# Patient Record
Sex: FEMALE | Race: WHITE | NOT HISPANIC OR LATINO | Employment: FULL TIME | URBAN - NONMETROPOLITAN AREA
[De-identification: names, ages, dates, MRNs, and addresses within clinical notes are randomized per-mention and may not be internally consistent; named-entity substitution may affect disease eponyms.]

---

## 2020-08-10 ENCOUNTER — OFFICE VISIT CONVERTED (OUTPATIENT)
Dept: FAMILY MEDICINE CLINIC | Facility: CLINIC | Age: 25
End: 2020-08-10
Attending: NURSE PRACTITIONER

## 2020-12-01 ENCOUNTER — TELEPHONE CONVERTED (OUTPATIENT)
Dept: FAMILY MEDICINE CLINIC | Facility: CLINIC | Age: 25
End: 2020-12-01
Attending: NURSE PRACTITIONER

## 2021-02-05 ENCOUNTER — OFFICE VISIT CONVERTED (OUTPATIENT)
Dept: FAMILY MEDICINE CLINIC | Facility: CLINIC | Age: 26
End: 2021-02-05
Attending: NURSE PRACTITIONER

## 2021-02-05 ENCOUNTER — CONVERSION ENCOUNTER (OUTPATIENT)
Dept: FAMILY MEDICINE CLINIC | Facility: CLINIC | Age: 26
End: 2021-02-05

## 2021-02-09 ENCOUNTER — HOSPITAL ENCOUNTER (OUTPATIENT)
Dept: GENERAL RADIOLOGY | Facility: HOSPITAL | Age: 26
Discharge: HOME | End: 2021-02-09
Attending: NURSE PRACTITIONER

## 2021-02-09 LAB
ALBUMIN SERPL-MCNC: 4.2 G/DL (ref 3.5–5)
ALBUMIN/GLOB SERPL: 1.4 {RATIO} (ref 1.4–2.6)
ALP SERPL-CCNC: 129 U/L (ref 42–98)
ALT SERPL-CCNC: 9 U/L (ref 10–40)
ANION GAP SERPL CALC-SCNC: 15 MMOL/L (ref 8–19)
AST SERPL-CCNC: 14 U/L (ref 15–50)
BASOPHILS # BLD AUTO: 0.04 10*3/UL (ref 0–0.2)
BASOPHILS NFR BLD AUTO: 0.4 % (ref 0–3)
BILIRUB SERPL-MCNC: 0.37 MG/DL (ref 0.2–1.3)
BUN SERPL-MCNC: 9 MG/DL (ref 5–25)
BUN/CREAT SERPL: 13 {RATIO} (ref 6–20)
CALCIUM SERPL-MCNC: 9.1 MG/DL (ref 8.7–10.4)
CHLORIDE SERPL-SCNC: 101 MMOL/L (ref 99–111)
CHOLEST SERPL-MCNC: 123 MG/DL (ref 107–200)
CHOLEST/HDLC SERPL: 2.9 {RATIO} (ref 3–6)
CONV ABS IMM GRAN: 0.01 10*3/UL (ref 0–0.2)
CONV CO2: 25 MMOL/L (ref 22–32)
CONV IMMATURE GRAN: 0.1 % (ref 0–1.8)
CONV TOTAL PROTEIN: 7.2 G/DL (ref 6.3–8.2)
CREAT UR-MCNC: 0.7 MG/DL (ref 0.5–0.9)
DEPRECATED RDW RBC AUTO: 38.7 FL (ref 36.4–46.3)
EOSINOPHIL # BLD AUTO: 0.35 10*3/UL (ref 0–0.7)
EOSINOPHIL # BLD AUTO: 3.8 % (ref 0–7)
ERYTHROCYTE [DISTWIDTH] IN BLOOD BY AUTOMATED COUNT: 13 % (ref 11.7–14.4)
GFR SERPLBLD BASED ON 1.73 SQ M-ARVRAT: >60 ML/MIN/{1.73_M2}
GLOBULIN UR ELPH-MCNC: 3 G/DL (ref 2–3.5)
GLUCOSE SERPL-MCNC: 95 MG/DL (ref 65–99)
HCT VFR BLD AUTO: 39.2 % (ref 37–47)
HDLC SERPL-MCNC: 42 MG/DL (ref 40–60)
HGB BLD-MCNC: 13 G/DL (ref 12–16)
LDLC SERPL CALC-MCNC: 62 MG/DL (ref 70–100)
LYMPHOCYTES # BLD AUTO: 3.15 10*3/UL (ref 1–5)
LYMPHOCYTES NFR BLD AUTO: 33.9 % (ref 20–45)
MCH RBC QN AUTO: 27.1 PG (ref 27–31)
MCHC RBC AUTO-ENTMCNC: 33.2 G/DL (ref 33–37)
MCV RBC AUTO: 81.7 FL (ref 81–99)
MONOCYTES # BLD AUTO: 0.54 10*3/UL (ref 0.2–1.2)
MONOCYTES NFR BLD AUTO: 5.8 % (ref 3–10)
NEUTROPHILS # BLD AUTO: 5.21 10*3/UL (ref 2–8)
NEUTROPHILS NFR BLD AUTO: 56 % (ref 30–85)
NRBC CBCN: 0 % (ref 0–0.7)
OSMOLALITY SERPL CALC.SUM OF ELEC: 282 MOSM/KG (ref 273–304)
PLATELET # BLD AUTO: 278 10*3/UL (ref 130–400)
PMV BLD AUTO: 10.4 FL (ref 9.4–12.3)
POTASSIUM SERPL-SCNC: 3.7 MMOL/L (ref 3.5–5.3)
RBC # BLD AUTO: 4.8 10*6/UL (ref 4.2–5.4)
SODIUM SERPL-SCNC: 137 MMOL/L (ref 135–147)
TRIGL SERPL-MCNC: 95 MG/DL (ref 40–150)
TSH SERPL-ACNC: 1.51 M[IU]/L (ref 0.27–4.2)
VLDLC SERPL-MCNC: 19 MG/DL (ref 5–37)
WBC # BLD AUTO: 9.3 10*3/UL (ref 4.8–10.8)

## 2021-05-10 NOTE — H&P
History and Physical      Patient Name: Sade Kilgore   Patient ID: [id number]   Sex: Female   YOB: 1995    Primary Care Provider: Yazmin POP   Referring Provider: Yazmin POP    Visit Date: August 10, 2020    Provider: DONN Finney   Location: [facility]   Location Address: [address] [place] [address] [address]  [employer], [place]  [id number]   Location Phone: [phone]          Chief Complaint  · Establish care.      History Of Present Illness  Sade Kilgore is a 24 year old /White female who presents for evaluation and treatment of:      New patient/establish care:    Previous patient: Dr. Morris    Lives in [place], works at [employer], goes to [employer], lives with mom.    No current daily medications. Has Ventolin inhaler uses PRN.     Acute complaint of nasal congestion and cough x several months. States she takes occasional Claritin and Robitussin to treat. Cough is worse at night. Denies fever, chills, SOB, HA, body aches, sore throat, loss of taste or smell.       Allergy List  Allergen Name Date Reaction Notes   NO KNOWN DRUG ALLERGIES --  --  --        Allergies Reconciled  Social History  Finding Status Start/Stop Quantity Notes   Tobacco Never --/-- --  Never         Review of Systems  · Constitutional  o Denies  o : fever, fatigue, weight loss, weight gain  · HENT  o Admits  o : nasal congestion  o Denies  o : headaches, sore throat  · Cardiovascular  o Denies  o : lower extremity edema, claudication, chest pressure, palpitations  · Respiratory  o Admits  o : cough  o Denies  o : shortness of breath, wheezing, hemoptysis, dyspnea on exertion  · Gastrointestinal  o Denies  o : nausea, vomiting, diarrhea, constipation, abdominal pain  · Musculoskeletal  o Denies  o : muscle pain  · Psychiatric  o Denies  o : anxiety, depression, suicidal ideation, homicidal ideation  · Allergic-Immunologic  o Admits  o :  "sinus allergy symptoms  o Denies  o : frequent illnesses      Vitals  Date Time BP Position Site L\R Cuff Size HR RR TEMP (F) WT  HT  BMI kg/m2 BSA m2 O2 Sat HC       08/10/2020 10:31 /66 Sitting    70 - R 20 98.8 191lbs 4oz 5'  4\" 32.83 1.98 98 %          Physical Examination  · Constitutional  o Appearance  o : no acute distress, well-nourished  · Head and Face  o Head  o :   § Inspection  § : atraumatic, normocephalic  o Face  o :   § Inspection  § : no facial lesions  · Eyes  o Eyes  o : extraocular movements intact, no scleral icterus, no conjunctival injection  · Ears, Nose, Mouth and Throat  o Ears  o :   § External Ears  § : normal  § Otoscopic Examination  § : tympanic membrane appearance within normal limits bilaterally  o Nose  o :   § Intranasal Exam  § : nares patent  o Oral Cavity  o :   § Oral Mucosa  § : moist mucous membranes  o Throat  o :   § Oropharynx  § : discharge present, tonsils within normal limits  · Respiratory  o Respiratory Effort  o : breathing comfortably, symmetric chest rise  o Auscultation of Lungs  o : clear to asculatation bilaterally, no wheezes, rales, or rhonchii  · Cardiovascular  o Heart  o :   § Auscultation of Heart  § : regular rate and rhythm, no murmurs, rubs, or gallops  o Peripheral Vascular System  o :   § Extremities  § : no edema  · Lymphatic  o Neck  o : no lymphadenopathy present  o Supraclavicular Nodes  o : no supraclavicular nodes  · Skin and Subcutaneous Tissue  o General Inspection  o : no lesions present, no areas of discoloration, skin turgor normal  · Neurologic  o Mental Status Examination  o :   § Orientation  § : grossly oriented to person, place and time  o Gait and Station  o :   § Gait Screening  § : normal gait  · Psychiatric  o General  o : normal mood and affect              Assessment  · Allergic rhinitis due to allergen     477.9/J30.9  · Asthma     493.90/J45.909  · Screening for depression     V79.0/Z13.89  · Establishing care with new " doctor, encounter for     V65.8/Z76.89       New patient/establish care:    Allergic rhinitis/asthma:  DC claritin  Start zyrtec 10mg PO qd  Start flutciasone UAD qd  Start montelukast 10mg PO qhs  Discussed all med SE/AEs including all BB warnings, pt will DC immed and notify office if thse occur  Refilled Ventolin inhaler UAD prn  Avoid allergy/sinus triggers  Consider allergist referral if necessary     Problems Reconciled  Plan  · Orders  o ACO-18: Negative screen for clinical depression using a standardized tool () - V79.0/Z13.89 - 08/10/2020  o ACO-39: Current medications updated and reviewed () - - 08/10/2020  o ACO-18: Negative screen for clinical depression using a standardized tool () - V79.0/Z13.89 - 08/10/2020  o ACO-14: Influenza immunization was not administered for reasons documented () - - 08/10/2020   declined  · Medications  o Singulair 10 mg oral tablet   SIG: take 1 tablet (10 mg) by oral route once daily in the evening for 30 days   DISP: (30) tablets with 5 refills  Prescribed on 08/10/2020     o Ventolin HFA 90 mcg/actuation inhalation HFA aerosol inhaler   SIG: inhale 1 - 2 puffs (90 - 180 mcg) by inhalation route every 4-6 hours as needed   DISP: (1) 8 gm canister with 5 refills  Prescribed on 08/10/2020     o fluticasone propionate 50 mcg/actuation nasal spray,suspension   SIG: spray 1 - 2 sprays in each nostril by intranasal route once daily   DISP: (1) 9.9 ml aer w/adap with 5 refills  Prescribed on 08/10/2020     o cetirizine 10 mg oral tablet   SIG: take 1 tablet (10 mg) by oral route once daily   DISP: (30) tablets with 5 refills  Prescribed on 08/10/2020     o Medications have been Reconciled  o Transition of Care or Provider Policy  · Instructions  o Depression Screen completed and scanned into the EMR under the designated folder within the patient's documents.  o Today's PHQ-9 result is __0_  o The provider screening met the required time of 15  minutes.  o Patient is taking medications as prescribed and doing well.   o Take all medications as prescribed/directed.  o Rest. Increase Fluids.  o Patient was educated/instructed on their diagnosis, treatment and medications prior to discharge from the clinic today.  o Patient instructed to seek medical attention urgently for new or worsening symptoms.  o Trusted Web sites were provided.  o Call the office with any concerns or questions.  o Bring all medicines with their bottles to each office visit.  o Chronic conditions reviewed and taken into consideration for today's treatment plan.  o Flu vaccine declined.  o Discussed Covid-19 precautions including, but not limited to, social distancing, avoid touching your face, and hand washing.   · Disposition  o Call or Return if symptoms worsen or persist.  o Follow Up PRN.  o Follow Up in 1 year.  o Proceed to ED for all medical emergencies.            Electronically Signed by: DONN Finney -Author on August 10, 2020 10:51:26 AM

## 2021-05-13 NOTE — PROGRESS NOTES
Progress Note      Patient Name: Sade Kilgore   Patient ID: [id number]   Sex: Female   YOB: 1995    Primary Care Provider: Yazmin POP   Referring Provider: Yazmin POP    Visit Date: December 1, 2020    Provider: DONN Finney   Location: [facility]   Location Address: [address] [place] [address]  [id number]   Location Phone: [phone]          Chief Complaint  · Cough, drainage, no fever, x1 month.      History Of Present Illness  TELEHEALTH TELEPHONE VISIT  Sade Kilgore is a 25 year old /White female who is presenting for evaluation via telehealth telephone visit. Verbal consent obtained before beginning visit.   Provider spent 13 minutes with patient during telehealth visit.   The following staff were present during this visit: DONN Finney   Past Medical History/Overview of Patient Symptoms  Sade Kilgore is a 25 year old /White female who presents for evaluation and treatment of:      Pt c/o cough, nasal congestion, L ear pain x 1 month worsening over the past week. Taking Zyrtec, Flonase, and singulair to treat. Denies fever, chills, SOB, HA, sudden loss of taste or smell, myalgia. Denies any recent travel or sick contacts.       Medication List  Name Date Started Instructions   cetirizine 10 mg oral tablet 08/10/2020 take 1 tablet (10 mg) by oral route once daily   fluticasone propionate 50 mcg/actuation nasal spray,suspension 08/10/2020 spray 1 - 2 sprays in each nostril by intranasal route once daily   Singulair 10 mg oral tablet 08/10/2020 take 1 tablet (10 mg) by oral route once daily in the evening for 30 days   Ventolin HFA 90 mcg/actuation inhalation HFA aerosol inhaler 08/10/2020 inhale 1 - 2 puffs (90 - 180 mcg) by inhalation route every 4-6 hours as needed         Allergy List  Allergen Name Date Reaction Notes   NO KNOWN DRUG ALLERGIES --  --  --        Allergies  Reconciled  Social History  Finding Status Start/Stop Quantity Notes   Tobacco Never --/-- --  Never         Review of Systems  · Constitutional  o Denies  o : fever, fatigue, weight loss, weight gain  · HENT  o Admits  o : nasal congestion, ear pain  o Denies  o : headaches  · Cardiovascular  o Denies  o : lower extremity edema, claudication, chest pressure, palpitations  · Respiratory  o Admits  o : cough  o Denies  o : shortness of breath, wheezing, hemoptysis, dyspnea on exertion  · Gastrointestinal  o Denies  o : nausea, vomiting, diarrhea, constipation, abdominal pain  · Musculoskeletal  o Denies  o : back pain  · Psychiatric  o Denies  o : anxiety, depression, suicidal ideation, homicidal ideation              Assessment  · Allergic rhinitis due to allergen     477.9/J30.9  · Bronchitis, acute     466.0/J20.9  · Cough     786.2/R05  · Ear pain, left     388.70/H92.02       Bronchitis, acute:  Zpack UAD   Medrol Dose Srikanth UAD  Bromfed 10ml q4-6hrs PRN cough  increase rest  Increase clear fluids  Avoid allergy triggers  Continue allergy medications as directed       Plan  · Orders  o ACO-39: Current medications updated and reviewed (1159F, ) - - 12/01/2020  o ACO-14: Influenza immunization was not administered for reasons documented [facility] ([id number]) - - 12/01/2020   Declined  o Physician Telephone Evaluation, 11-20 minutes ([id number]) - - 12/01/2020  · Medications  o azithromycin 250 mg oral tablet   SIG: take 2 tablets (500 mg) by oral route once daily for 1 day then 1 tablet (250 mg) by oral route once daily for 4 days   DISP: (6) Tablet with 0 refills  Prescribed on 12/01/2020     o Medrol (Srikanth) 4 mg oral tablets,dose pack   SIG: take as directed   DISP: (1) Packet with 0 refills  Prescribed on 12/01/2020     o Bromfed DM 2-30-10 mg/5 mL oral syrup   SIG: take 10 milliliters by oral route every 4 hours   DISP: (118) Milliliter with 0 refills  Prescribed on 12/01/2020     o Medications have been  Reconciled  o Transition of Care or Provider Policy  · Instructions  o Plan Of Care: Bronchitis, acute  o Chronic conditions reviewed and taken into consideration for today's treatment plan.  o Patient instructed to seek medical attention urgently for new or worsening symptoms.  o Patient was educated/instructed on their diagnosis, treatment and medications prior to discharge from the clinic today.  o Take all medications as prescribed/directed.  o Call the office with any concerns or questions.  o Risks, benefits, and alternatives were discussed with the patient. The patient is aware of risks associated with: all meds and conditions.   o Discussed Covid-19 precautions including, but not limited to, social distancing, avoid touching your face, and hand washing.   o Flu vaccine declined.  · Disposition  o Call or Return if symptoms worsen or persist.  o Follow Up PRN.  o Proceed to ED for all medical emergencies.            Electronically Signed by: DONN Finney -Author on December 1, 2020 10:47:22 AM

## 2021-05-14 VITALS
OXYGEN SATURATION: 100 % | HEART RATE: 75 BPM | SYSTOLIC BLOOD PRESSURE: 124 MMHG | RESPIRATION RATE: 20 BRPM | DIASTOLIC BLOOD PRESSURE: 70 MMHG | BODY MASS INDEX: 32.97 KG/M2 | WEIGHT: 193.12 LBS | TEMPERATURE: 97.8 F | HEIGHT: 64 IN

## 2021-05-14 NOTE — PROGRESS NOTES
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 Acute c/o facial acne. Believes it to be d/t mask. Has tried several OTC medications without success.       Medication List  Name Date Started Instructions   cetirizine 10 mg oral tablet 08/10/2020 take 1 tablet (10 mg) by oral route once daily   fluticasone propionate 50 mcg/actuation nasal spray,suspension 08/10/2020 spray 1 - 2 sprays in each nostril by intranasal route once daily   Singulair 10 mg oral tablet 08/10/2020 take 1 tablet (10 mg) by oral route once daily in the evening for 30 days   Ventolin HFA 90 mcg/actuation inhalation HFA aerosol inhaler 08/10/2020 inhale 1 - 2 puffs (90 - 180 mcg) by inhalation route every 4-6 hours as needed         Allergy List  Allergen Name Date Reaction Notes   NO KNOWN DRUG ALLERGIES --  --  --        Allergies Reconciled  Social History  Finding Status Start/Stop Quantity Notes   Tobacco Never --/-- --  Never         Review of Systems  · Constitutional  o Denies  o : fever, fatigue, weight loss, weight gain  · HENT  o Denies  o : headaches, nasal congestion, sore throat  · Cardiovascular  o Denies  o : lower extremity edema, claudication, chest pressure, palpitations  · Respiratory  o Denies  o : shortness of breath, wheezing, cough, hemoptysis, dyspnea on  "exertion  · Gastrointestinal  o Denies  o : nausea, vomiting, diarrhea, constipation, abdominal pain  · Integument  o Admits  o : acne  · Musculoskeletal  o Denies  o : muscle pain  · Psychiatric  o Denies  o : anxiety, depression, suicidal ideation, homicidal ideation      Vitals  Date Time BP Position Site L\R Cuff Size HR RR TEMP (F) WT  HT  BMI kg/m2 BSA m2 O2 Sat FR L/min FiO2 HC       02/05/2021 04:00 /70 Sitting    75 - R 20 97.8 193lbs 2oz 5'  4\" 33.15 1.99 100 %  21%          Physical Examination  · Constitutional  o Appearance  o : no acute distress, well-nourished  · Head and Face  o Head  o :   § Inspection  § : atraumatic, normocephalic  o Face  o :   § Inspection  § : no facial lesions  · Eyes  o Eyes  o : extraocular movements intact, no scleral icterus, no conjunctival injection  · Ears, Nose, Mouth and Throat  o Ears  o :   § External Ears  § : normal  o Nose  o :   § Intranasal Exam  § : nares patent  o Oral Cavity  o :   § Oral Mucosa  § : moist mucous membranes  · Neck  o Thyroid  o : gland size normal, nontender, no nodules or masses present on palpation, symmetric  · Respiratory  o Respiratory Effort  o : breathing comfortably, symmetric chest rise  o Auscultation of Lungs  o : clear to asculatation bilaterally, no wheezes, rales, or rhonchii  · Cardiovascular  o Heart  o :   § Auscultation of Heart  § : regular rate and rhythm, no murmurs, rubs, or gallops  o Peripheral Vascular System  o :   § Extremities  § : no edema  · Lymphatic  o Neck  o : no lymphadenopathy present  o Supraclavicular Nodes  o : no supraclavicular nodes  · Skin and Subcutaneous Tissue  o General Inspection  o : no lesions present, no areas of discoloration, skin turgor normal  · Neurologic  o Mental Status Examination  o :   § Orientation  § : grossly oriented to person, place and time  o Gait and Station  o :   § Gait Screening  § : normal gait  · Psychiatric  o General  o : normal mood and " affect              Assessment  · Allergic rhinitis due to allergen     477.9/J30.9  · Cough     786.2/R05  · Obesity     278.00/E66.9  · Screening for lipid disorders     V77.91/Z13.220  · Screening for thyroid disorder     V77.0/Z13.29  · Acne     706.1/L70.9       Acne:  Clinda 1% foam apply twice daily  tretinoin topical 0.1% once daily before bedtime  Keep clean and dry  Consider derm referral if needed    Obesity:  Encouraged weight loss  regular activity/exercise 30 min at least 4-5 days per week     Order for labs   Filled out, signed, and scanned biometric screening pwk into chart  Pt would like us to fax to number on form when completed     Problems Reconciled  Plan  · Orders  o Physical, Primary Care Panel (CBC, CMP, Lipid, TSH) [facility] ([id number], 84071, 88923, [address]1980) - 786.2/R05, V77.91/Z13.220, V77.0/Z13.29, 706.1/L70.9 - 02/05/2021  o ACO-14: Influenza immunization was not administered for reasons documented [facility] () - - 02/05/2021   pt declined flu shot  o ACO-39: Current medications updated and reviewed (2672F, ) - - 02/05/2021  · Medications  o clindamycin phosphate 1 % topical foam   SIG: apply to the affected area(s) after washing with a mild soap by topical route once daily   DISP: (1) Bottle with 2 refills  Prescribed on 02/05/2021     o tretinoin 0.01 % topical gel   SIG: apply to the affected area(s) by topical route once daily at bedtime   DISP: (1) Tube with 2 refills  Prescribed on 02/05/2021     o azithromycin 250 mg oral tablet   SIG: take 2 tablets (500 mg) by oral route once daily for 1 day then 1 tablet (250 mg) by oral route once daily for 4 days   DISP: (6) Tablet with 0 refills  Discontinued on 02/05/2021     o Bromfed DM 2-30-10 mg/5 mL oral syrup   SIG: take 10 milliliters by oral route every 4 hours   DISP: (118) Milliliter with 0 refills  Discontinued on 02/05/2021     o Medrol (Srikanth) 4 mg oral tablets,dose pack   SIG: take as directed   DISP: (1) Packet with 0  refills  Discontinued on 02/05/2021     o Medications have been Reconciled  o Transition of Care or Provider Policy  · Instructions  o Take all medications as prescribed/directed.  o Patient was educated/instructed on their diagnosis, treatment and medications prior to discharge from the clinic today.  o Patient counseled to reduce calorie intake.  o Patient was instructed to exercise regularly.  o Patient instructed to seek medical attention urgently for new or worsening symptoms.  o Call the office with any concerns or questions.  o Bring all medicines with their bottles to each office visit.  o Risks, benefits, and alternatives were discussed with the patient. The patient is aware of risks associated with:  o Chronic conditions reviewed and taken into consideration for today's treatment plan.  o Flu vaccine declined.  o Discussed Covid-19 precautions including, but not limited to, social distancing, avoid touching your face, and hand washing.   · Disposition  o Call or Return if symptoms worsen or persist.  o Follow Up PRN.  o Proceed to ED for all medical emergencies.            Electronically Signed by: DONN Finney -Author on February 5, 2021 05:14:08 PM

## 2021-05-15 VITALS
BODY MASS INDEX: 32.65 KG/M2 | HEART RATE: 70 BPM | HEIGHT: 64 IN | DIASTOLIC BLOOD PRESSURE: 66 MMHG | TEMPERATURE: 98.8 F | RESPIRATION RATE: 20 BRPM | SYSTOLIC BLOOD PRESSURE: 138 MMHG | WEIGHT: 191.25 LBS | OXYGEN SATURATION: 98 %

## 2021-11-08 ENCOUNTER — LAB (OUTPATIENT)
Dept: LAB | Facility: HOSPITAL | Age: 26
End: 2021-11-08

## 2021-11-08 ENCOUNTER — OFFICE VISIT (OUTPATIENT)
Dept: FAMILY MEDICINE CLINIC | Facility: CLINIC | Age: 26
End: 2021-11-08

## 2021-11-08 VITALS
SYSTOLIC BLOOD PRESSURE: 111 MMHG | DIASTOLIC BLOOD PRESSURE: 73 MMHG | HEIGHT: 64 IN | OXYGEN SATURATION: 100 % | HEART RATE: 76 BPM | WEIGHT: 184 LBS | RESPIRATION RATE: 20 BRPM | TEMPERATURE: 98 F | BODY MASS INDEX: 31.41 KG/M2

## 2021-11-08 DIAGNOSIS — Z00.00 ANNUAL PHYSICAL EXAM: ICD-10-CM

## 2021-11-08 DIAGNOSIS — E66.9 CLASS 1 OBESITY WITHOUT SERIOUS COMORBIDITY WITH BODY MASS INDEX (BMI) OF 30.0 TO 30.9 IN ADULT, UNSPECIFIED OBESITY TYPE: ICD-10-CM

## 2021-11-08 DIAGNOSIS — Z13.220 SCREENING FOR LIPID DISORDERS: ICD-10-CM

## 2021-11-08 DIAGNOSIS — Z13.29 SCREENING FOR THYROID DISORDER: ICD-10-CM

## 2021-11-08 DIAGNOSIS — Z00.00 ANNUAL PHYSICAL EXAM: Primary | ICD-10-CM

## 2021-11-08 LAB
ALBUMIN SERPL-MCNC: 4.5 G/DL (ref 3.5–5.2)
ALBUMIN/GLOB SERPL: 1.7 G/DL
ALP SERPL-CCNC: 109 U/L (ref 39–117)
ALT SERPL W P-5'-P-CCNC: 15 U/L (ref 1–33)
ANION GAP SERPL CALCULATED.3IONS-SCNC: 5.9 MMOL/L (ref 5–15)
AST SERPL-CCNC: 20 U/L (ref 1–32)
BASOPHILS # BLD AUTO: 0.04 10*3/MM3 (ref 0–0.2)
BASOPHILS NFR BLD AUTO: 0.5 % (ref 0–1.5)
BILIRUB SERPL-MCNC: 0.8 MG/DL (ref 0–1.2)
BUN SERPL-MCNC: 11 MG/DL (ref 6–20)
BUN/CREAT SERPL: 18.3 (ref 7–25)
CALCIUM SPEC-SCNC: 9.7 MG/DL (ref 8.6–10.5)
CHLORIDE SERPL-SCNC: 105 MMOL/L (ref 98–107)
CHOLEST SERPL-MCNC: 141 MG/DL (ref 0–200)
CO2 SERPL-SCNC: 28.1 MMOL/L (ref 22–29)
CREAT SERPL-MCNC: 0.6 MG/DL (ref 0.57–1)
DEPRECATED RDW RBC AUTO: 40 FL (ref 37–54)
EOSINOPHIL # BLD AUTO: 0.22 10*3/MM3 (ref 0–0.4)
EOSINOPHIL NFR BLD AUTO: 2.5 % (ref 0.3–6.2)
ERYTHROCYTE [DISTWIDTH] IN BLOOD BY AUTOMATED COUNT: 13.3 % (ref 12.3–15.4)
GFR SERPL CREATININE-BSD FRML MDRD: 123 ML/MIN/1.73
GLOBULIN UR ELPH-MCNC: 2.7 GM/DL
GLUCOSE SERPL-MCNC: 82 MG/DL (ref 65–99)
HCT VFR BLD AUTO: 39.6 % (ref 34–46.6)
HDLC SERPL-MCNC: 43 MG/DL (ref 40–60)
HGB BLD-MCNC: 13.1 G/DL (ref 12–15.9)
IMM GRANULOCYTES # BLD AUTO: 0.02 10*3/MM3 (ref 0–0.05)
IMM GRANULOCYTES NFR BLD AUTO: 0.2 % (ref 0–0.5)
LDLC SERPL CALC-MCNC: 85 MG/DL (ref 0–100)
LDLC/HDLC SERPL: 1.98 {RATIO}
LYMPHOCYTES # BLD AUTO: 2.21 10*3/MM3 (ref 0.7–3.1)
LYMPHOCYTES NFR BLD AUTO: 25.3 % (ref 19.6–45.3)
MCH RBC QN AUTO: 27.3 PG (ref 26.6–33)
MCHC RBC AUTO-ENTMCNC: 33.1 G/DL (ref 31.5–35.7)
MCV RBC AUTO: 82.5 FL (ref 79–97)
MONOCYTES # BLD AUTO: 0.54 10*3/MM3 (ref 0.1–0.9)
MONOCYTES NFR BLD AUTO: 6.2 % (ref 5–12)
NEUTROPHILS NFR BLD AUTO: 5.72 10*3/MM3 (ref 1.7–7)
NEUTROPHILS NFR BLD AUTO: 65.3 % (ref 42.7–76)
NRBC BLD AUTO-RTO: 0 /100 WBC (ref 0–0.2)
PLATELET # BLD AUTO: 291 10*3/MM3 (ref 140–450)
PMV BLD AUTO: 10.2 FL (ref 6–12)
POTASSIUM SERPL-SCNC: 4 MMOL/L (ref 3.5–5.2)
PROT SERPL-MCNC: 7.2 G/DL (ref 6–8.5)
RBC # BLD AUTO: 4.8 10*6/MM3 (ref 3.77–5.28)
SODIUM SERPL-SCNC: 139 MMOL/L (ref 136–145)
TRIGL SERPL-MCNC: 65 MG/DL (ref 0–150)
TSH SERPL DL<=0.05 MIU/L-ACNC: 0.61 UIU/ML (ref 0.27–4.2)
VLDLC SERPL-MCNC: 13 MG/DL (ref 5–40)
WBC # BLD AUTO: 8.75 10*3/MM3 (ref 3.4–10.8)

## 2021-11-08 PROCEDURE — 85025 COMPLETE CBC W/AUTO DIFF WBC: CPT

## 2021-11-08 PROCEDURE — 80053 COMPREHEN METABOLIC PANEL: CPT

## 2021-11-08 PROCEDURE — 99395 PREV VISIT EST AGE 18-39: CPT | Performed by: NURSE PRACTITIONER

## 2021-11-08 PROCEDURE — 84443 ASSAY THYROID STIM HORMONE: CPT

## 2021-11-08 PROCEDURE — 80061 LIPID PANEL: CPT

## 2021-11-08 PROCEDURE — 36415 COLL VENOUS BLD VENIPUNCTURE: CPT

## 2021-11-08 NOTE — PATIENT INSTRUCTIONS
Preventive Care 21-39 Years Old, Female  Preventive care refers to lifestyle choices and visits with your health care provider that can promote health and wellness. This includes:  · A yearly physical exam. This is also called an annual wellness visit.  · Regular dental and eye exams.  · Immunizations.  · Screening for certain conditions.  · Healthy lifestyle choices, such as:  ? Eating a healthy diet.  ? Getting regular exercise.  ? Not using drugs or products that contain nicotine and tobacco.  ? Limiting alcohol use.  What can I expect for my preventive care visit?  Physical exam  Your health care provider may check your:  · Height and weight. These may be used to calculate your BMI (body mass index). BMI is a measurement that tells if you are at a healthy weight.  · Heart rate and blood pressure.  · Body temperature.  · Skin for abnormal spots.  Counseling  Your health care provider may ask you questions about your:  · Past medical problems.  · Family's medical history.  · Alcohol, tobacco, and drug use.  · Emotional well-being.  · Home life and relationship well-being.  · Sexual activity.  · Diet, exercise, and sleep habits.  · Work and work environment.  · Access to firearms.  · Method of birth control.  · Menstrual cycle.  · Pregnancy history.  What immunizations do I need?    Vaccines are usually given at various ages, according to a schedule. Your health care provider will recommend vaccines for you based on your age, medical history, and lifestyle or other factors, such as travel or where you work.  What tests do I need?    Blood tests  · Lipid and cholesterol levels. These may be checked every 5 years starting at age 20.  · Hepatitis C test.  · Hepatitis B test.  Screening  · Diabetes screening. This is done by checking your blood sugar (glucose) after you have not eaten for a while (fasting).  · STD (sexually transmitted disease) testing, if you are at risk.  · BRCA-related cancer screening. This may be  done if you have a family history of breast, ovarian, tubal, or peritoneal cancers.  · Pelvic exam and Pap test. This may be done every 3 years starting at age 21. Starting at age 30, this may be done every 5 years if you have a Pap test in combination with an HPV test.  Talk with your health care provider about your test results, treatment options, and if necessary, the need for more tests.  Follow these instructions at home:  Eating and drinking    · Eat a healthy diet that includes fresh fruits and vegetables, whole grains, lean protein, and low-fat dairy products.  · Take vitamin and mineral supplements as recommended by your health care provider.  · Do not drink alcohol if:  ? Your health care provider tells you not to drink.  ? You are pregnant, may be pregnant, or are planning to become pregnant.  · If you drink alcohol:  ? Limit how much you have to 0-1 drink a day.  ? Be aware of how much alcohol is in your drink. In the [place] one drink equals one 12 oz bottle of beer (355 mL), one 5 oz glass of wine (148 mL), or one 1½ oz glass of hard liquor (44 mL).    Lifestyle  · Take daily care of your teeth and gums. Brush your teeth every morning and night with fluoride toothpaste. Floss one time each day.  · Stay active. Exercise for at least 30 minutes 5 or more days each week.  · Do not use any products that contain nicotine or tobacco, such as cigarettes, e-cigarettes, and chewing tobacco. If you need help quitting, ask your health care provider.  · Do not use drugs.  · If you are sexually active, practice safe sex. Use a condom or other form of birth control (contraception) in order to prevent pregnancy and STIs (sexually transmitted infections). If you plan to become pregnant, see your health care provider for a pre-conception visit.  · Find healthy ways to cope with stress, such as:  ? Meditation, yoga, or listening to music.  ? Journaling.  ? Talking to a trusted person.  ? Spending time with friends and  family.  Safety  · Always wear your seat belt while driving or riding in a vehicle.  · Do not drive:  ? If you have been drinking alcohol. Do not ride with someone who has been drinking.  ? When you are tired or distracted.  ? While texting.  · Wear a helmet and other protective equipment during sports activities.  · If you have firearms in your house, make sure you follow all gun safety procedures.  · Seek help if you have been physically or sexually abused.  What's next?  · Go to your health care provider once a year for an annual wellness visit.  · Ask your health care provider how often you should have your eyes and teeth checked.  · Stay up to date on all vaccines.  This information is not intended to replace advice given to you by your health care provider. Make sure you discuss any questions you have with your health care provider.  Document Revised: 09/02/2020 Document Reviewed: 08/29/2019  [employer] Patient Education © 2021 [employer]

## 2021-11-08 NOTE — PROGRESS NOTES
"Chief Complaint   Patient presents with   • Employment Physical     Lab work needed.       Subjective          Sade Kilgore presents to [facility]    Pt presents for annual PE/labs. Last labs 2/2021 reviewed. Brought pwk. Will fill out and return to patient. No acute issues or complaints. Pt is not sexually active. States she has never been sexually active in the past.       Past History:  Medical History: has a past medical history of Asthma and Class 1 obesity without serious comorbidity with body mass index (BMI) of 30.0 to 30.9 in adult (11/8/2021).   Surgical History: has no past surgical history on file.   Family History: family history includes Cancer in her father; No Known Problems in her mother.   Social History: reports that she has never smoked. She has never used smokeless tobacco. She reports previous alcohol use. She reports that she does not use drugs.  Allergies: Patient has no known allergies.  (Not in a hospital admission)       Social History     Socioeconomic History   • Marital status: Single   Tobacco Use   • Smoking status: Never Smoker   • Smokeless tobacco: Never Used   Vaping Use   • Vaping Use: Never used   Substance and Sexual Activity   • Alcohol use: Not Currently   • Drug use: Never   • Sexual activity: Defer       Health Maintenance Due   Topic Date Due   • ANNUAL PHYSICAL  Never done   • HEPATITIS C SCREENING  Never done       Objective     Vital Signs:   /73   Pulse 76   Temp 98 °F (36.7 °C)   Resp 20   Ht 162.6 cm (64\")   Wt 83.5 kg (184 lb)   SpO2 100%   BMI 31.58 kg/m²       Physical Exam  Vitals reviewed.   Constitutional:       General: She is not in acute distress.     Appearance: She is obese.   HENT:      Head: Normocephalic.      Right Ear: Tympanic membrane normal.      Left Ear: Tympanic membrane normal.      Nose: Nose normal.      Mouth/Throat:      Pharynx: Oropharynx is clear. No posterior oropharyngeal " erythema.   Eyes:      General: No scleral icterus.     Extraocular Movements: Extraocular movements intact.      Conjunctiva/sclera: Conjunctivae normal.      Pupils: Pupils are equal, round, and reactive to light.   Cardiovascular:      Rate and Rhythm: Normal rate and regular rhythm.      Pulses: Normal pulses.      Heart sounds: Normal heart sounds.   Pulmonary:      Effort: Pulmonary effort is normal.      Breath sounds: Normal breath sounds.   Abdominal:      General: Bowel sounds are normal.      Palpations: Abdomen is soft.   Musculoskeletal:         General: Normal range of motion.      Cervical back: Neck supple.   Skin:     General: Skin is warm and dry.   Neurological:      Mental Status: She is alert and oriented to person, place, and time.   Psychiatric:         Mood and Affect: Mood normal.         Behavior: Behavior normal.         Thought Content: Thought content normal.         Judgment: Judgment normal.          Review of Systems   Constitutional: Negative for chills, fatigue and fever.   HENT: Negative for congestion, ear pain, sinus pressure and sore throat.    Eyes: Negative for blurred vision.   Respiratory: Negative for cough and shortness of breath.    Cardiovascular: Negative for chest pain, palpitations and leg swelling.   Gastrointestinal: Negative for abdominal pain, constipation, diarrhea, nausea, vomiting and GERD.   Musculoskeletal: Negative for arthralgias.   Skin: Negative for rash and skin lesions.   Neurological: Negative for dizziness and headache.   Psychiatric/Behavioral: Negative for sleep disturbance, suicidal ideas and depressed mood. The patient is not nervous/anxious.         Result Review :     Common labs    Common Labsle 2/9/21   Glucose 95   BUN 9   Creatinine 0.70   Sodium 137   Potassium 3.7   Chloride 101   Calcium 9.1   Albumin 4.2   Total Bilirubin 0.37   Alkaline Phosphatase 129 (A)   AST (SGOT) 14 (A)   ALT (SGPT) 9 (A)   WBC 9.30   Hemoglobin 13.0   Hematocrit  39.2   Platelets 278   Total Cholesterol 123   Triglycerides 95   HDL Cholesterol 42   LDL Cholesterol  62 (A)   (A) Abnormal value       Comments are available for some flowsheets but are not being displayed.                     Assessment and Plan    Diagnoses and all orders for this visit:    1. Annual physical exam (Primary)  Comments:  Pwk filled out  labs ordered   Discussed preventative measures   Orders:  -     Comprehensive Metabolic Panel; Future  -     CBC & Differential; Future    2. Screening for thyroid disorder  -     TSH; Future    3. Screening for lipid disorders  -     Lipid Panel; Future    4. Class 1 obesity without serious comorbidity with body mass index (BMI) of 30.0 to 30.9 in adult, unspecified obesity type  Assessment & Plan:  Patient's (Body mass index is 31.58 kg/m².) indicates that they are obese (BMI >30) with health conditions that include none . Weight is improving with lifestyle modifications. BMI is is above average; BMI management plan is completed. We discussed low calorie, low carb based diet program, portion control and increasing exercise.           Follow Up   Return in about 1 year (around 11/8/2022), or if symptoms worsen or fail to improve.  Patient was given instructions and counseling regarding her condition or for health maintenance advice. Please see specific information pulled into the AVS if appropriate.

## 2021-11-08 NOTE — ASSESSMENT & PLAN NOTE
Patient's (Body mass index is 31.58 kg/m².) indicates that they are obese (BMI >30) with health conditions that include none . Weight is improving with lifestyle modifications. BMI is is above average; BMI management plan is completed. We discussed low calorie, low carb based diet program, portion control and increasing exercise.

## 2021-11-10 ENCOUNTER — TELEPHONE (OUTPATIENT)
Dept: FAMILY MEDICINE CLINIC | Facility: CLINIC | Age: 26
End: 2021-11-10

## 2021-11-10 NOTE — TELEPHONE ENCOUNTER
----- Message from DONN Adair sent at 11/9/2021 12:45 PM EST -----  All labs within normal limits.

## 2022-01-04 ENCOUNTER — TELEPHONE (OUTPATIENT)
Dept: FAMILY MEDICINE CLINIC | Facility: CLINIC | Age: 27
End: 2022-01-04

## 2022-01-04 NOTE — TELEPHONE ENCOUNTER
Caller: ROBY REID    Relationship: Mother    Best call back number: [phone]    What is the best time to reach you: ANY    Who are you requesting to speak with (clinical staff, provider,  specific staff member): CLINICAL    What was the call regarding: PATIENT LEFT PAPERWORK FOR JULIO HOGAN TO FILL OUT FOR PHYSICAL ON 11/8 AND HAS NOT HEARD BACK FOR . PLEASE CALL AND ADVISE.     Do you require a callback: YES

## 2022-01-14 ENCOUNTER — OFFICE VISIT (OUTPATIENT)
Dept: FAMILY MEDICINE CLINIC | Facility: CLINIC | Age: 27
End: 2022-01-14

## 2022-01-14 VITALS
SYSTOLIC BLOOD PRESSURE: 126 MMHG | OXYGEN SATURATION: 100 % | RESPIRATION RATE: 16 BRPM | WEIGHT: 186.3 LBS | HEART RATE: 70 BPM | HEIGHT: 64 IN | DIASTOLIC BLOOD PRESSURE: 82 MMHG | TEMPERATURE: 98.3 F | BODY MASS INDEX: 31.8 KG/M2

## 2022-01-14 DIAGNOSIS — Z30.09 BIRTH CONTROL COUNSELING: Primary | ICD-10-CM

## 2022-01-14 DIAGNOSIS — E66.9 CLASS 1 OBESITY WITHOUT SERIOUS COMORBIDITY WITH BODY MASS INDEX (BMI) OF 30.0 TO 30.9 IN ADULT, UNSPECIFIED OBESITY TYPE: ICD-10-CM

## 2022-01-14 DIAGNOSIS — Z30.011 ENCOUNTER FOR INITIAL PRESCRIPTION OF CONTRACEPTIVE PILLS: ICD-10-CM

## 2022-01-14 PROCEDURE — 99213 OFFICE O/P EST LOW 20 MIN: CPT | Performed by: NURSE PRACTITIONER

## 2022-01-14 RX ORDER — NORGESTIMATE AND ETHINYL ESTRADIOL 7DAYSX3 28
1 KIT ORAL DAILY
Qty: 28 TABLET | Refills: 12 | Status: SHIPPED | OUTPATIENT
Start: 2022-01-14 | End: 2022-03-24 | Stop reason: SDUPTHER

## 2022-01-14 NOTE — PROGRESS NOTES
"Chief Complaint  Follow-up (wants to start medication for birth control )    Subjective          Sade Kilgore presents to [facility]  Pt presents requesting birth control. She is not currently sexually active. No previous birth control. States she would like to try the pill. Last menstrual period \"sometime last month\", believes she is due to start around the 27th of Jan. Last labs Feb 2021, reviewed. Nonsmoker.       Objective   Vital Signs:   /82 (BP Location: Left arm, Patient Position: Sitting)   Pulse 70   Temp 98.3 °F (36.8 °C) (Temporal)   Resp 16   Ht 162.6 cm (64\")   Wt 84.5 kg (186 lb 4.8 oz)   SpO2 100%   BMI 31.98 kg/m²     Physical Exam  Vitals reviewed.   Constitutional:       General: She is not in acute distress.     Appearance: Normal appearance.   HENT:      Head: Normocephalic.      Right Ear: Tympanic membrane normal.      Left Ear: Tympanic membrane normal.      Nose: Nose normal.      Mouth/Throat:      Pharynx: Oropharynx is clear. No posterior oropharyngeal erythema.   Eyes:      General: No scleral icterus.     Extraocular Movements: Extraocular movements intact.      Conjunctiva/sclera: Conjunctivae normal.      Pupils: Pupils are equal, round, and reactive to light.   Cardiovascular:      Rate and Rhythm: Normal rate and regular rhythm.      Pulses: Normal pulses.      Heart sounds: Normal heart sounds.   Pulmonary:      Effort: Pulmonary effort is normal.      Breath sounds: Normal breath sounds.   Abdominal:      General: Bowel sounds are normal.      Palpations: Abdomen is soft.   Musculoskeletal:         General: Normal range of motion.      Cervical back: Neck supple.   Skin:     General: Skin is warm and dry.   Neurological:      Mental Status: She is alert and oriented to person, place, and time.   Psychiatric:         Mood and Affect: Mood normal.         Behavior: Behavior normal.         Thought Content: Thought content " normal.         Judgment: Judgment normal.        Result Review :     Common labs    Common Labsle 2/9/21 11/8/21 11/8/21 11/8/21     1055 1055 1055   Glucose 95   82   BUN 9   11   Creatinine 0.70   0.60   eGFR Non African Am    123   Sodium 137   139   Potassium 3.7   4.0   Chloride 101   105   Calcium 9.1   9.7   Albumin 4.2   4.50   Total Bilirubin 0.37   0.8   Alkaline Phosphatase 129 (A)   109   AST (SGOT) 14 (A)   20   ALT (SGPT) 9 (A)   15   WBC 9.30 8.75     Hemoglobin 13.0 13.1     Hematocrit 39.2 39.6     Platelets 278 291     Total Cholesterol   141    Total Cholesterol 123      Triglycerides 95  65    HDL Cholesterol 42  43    LDL Cholesterol  62 (A)  85    (A) Abnormal value       Comments are available for some flowsheets but are not being displayed.                     Assessment and Plan    Diagnoses and all orders for this visit:    1. Birth control counseling (Primary)    2. Encounter for initial prescription of contraceptive pills  Assessment & Plan:  Discussed birth control options with patient.   Start ortho tri cyclin   Do not skip pills, if a pill is missed take as soon as remembered   Take same time each day  Always use condom during every sexual encounter, BCP does not protect against STDs.         3. Class 1 obesity without serious comorbidity with body mass index (BMI) of 30.0 to 30.9 in adult, unspecified obesity type  Assessment & Plan:  Patient's (Body mass index is 31.98 kg/m².) indicates that they are obese (BMI >30) with health conditions that include none . Weight is unchanged. BMI is is above average; BMI management plan is completed. We discussed low calorie, low carb based diet program, portion control and increasing exercise.       Other orders  -     norgestimate-ethinyl estradiol (Ortho Tri-Cyclen, 28,) 0.18/0.215/0.25 MG-35 MCG per tablet; Take 1 tablet by mouth Daily.  Dispense: 28 tablet; Refill: 12      Follow Up   Return in about 6 months (around 7/14/2022), or if  symptoms worsen or fail to improve.  Patient was given instructions and counseling regarding her condition or for health maintenance advice. Please see specific information pulled into the AVS if appropriate.

## 2022-01-14 NOTE — ASSESSMENT & PLAN NOTE
Patient's (Body mass index is 31.98 kg/m².) indicates that they are obese (BMI >30) with health conditions that include none . Weight is unchanged. BMI is is above average; BMI management plan is completed. We discussed low calorie, low carb based diet program, portion control and increasing exercise.

## 2022-01-14 NOTE — ASSESSMENT & PLAN NOTE
Discussed birth control options with patient.   Start ortho tri cyclin   Do not skip pills, if a pill is missed take as soon as remembered   Take same time each day  Always use condom during every sexual encounter, BCP does not protect against STDs.

## 2022-01-14 NOTE — PATIENT INSTRUCTIONS
Contraception Choices  Contraception, also called birth control, refers to methods or devices that prevent pregnancy.  Hormonal methods    Contraceptive implant  A contraceptive implant is a thin, plastic tube that contains a hormone that prevents pregnancy. It is different from an intrauterine device (IUD). It is inserted into the upper part of the arm by a health care provider. Implants can be effective for up to 3 years.  Progestin-only injections  Progestin-only injections are injections of progestin, a synthetic form of the hormone progesterone. They are given every 3 months by a health care provider.  Birth control pills  Birth control pills are pills that contain hormones that prevent pregnancy. They must be taken once a day, preferably at the same time each day. A prescription is needed to use this method of contraception.  Birth control patch  The birth control patch contains hormones that prevent pregnancy. It is placed on the skin and must be changed once a week for three weeks and removed on the fourth week. A prescription is needed to use this method of contraception.  Vaginal ring  A vaginal ring contains hormones that prevent pregnancy. It is placed in the vagina for three weeks and removed on the fourth week. After that, the process is repeated with a new ring. A prescription is needed to use this method of contraception.  Emergency contraceptive  Emergency contraceptives prevent pregnancy after unprotected sex. They come in pill form and can be taken up to 5 days after sex. They work best the sooner they are taken after having sex. Most emergency contraceptives are available without a prescription. This method should not be used as your only form of birth control.  Barrier methods    Male condom  A male condom is a thin sheath that is worn over the penis during sex. Condoms keep sperm from going inside a woman's body. They can be used with a sperm-killing substance (spermicide) to increase their  effectiveness. They should be thrown away after one use.  Female condom  A female condom is a soft, loose-fitting sheath that is put into the vagina before sex. The condom keeps sperm from going inside a woman's body. They should be thrown away after one use.  Diaphragm  A diaphragm is a soft, dome-shaped barrier. It is inserted into the vagina before sex, along with a spermicide. The diaphragm blocks sperm from entering the uterus, and the spermicide kills sperm. A diaphragm should be left in the vagina for 6-8 hours after sex and removed within 24 hours.  A diaphragm is prescribed and fitted by a health care provider. A diaphragm should be replaced every 1-2 years, after giving birth, after gaining more than 15 lb (6.8 kg), and after pelvic surgery.  Cervical cap  A cervical cap is a round, soft latex or plastic cup that fits over the cervix. It is inserted into the vagina before sex, along with spermicide. It blocks sperm from entering the uterus. The cap should be left in place for 6-8 hours after sex and removed within 48 hours. A cervical cap must be prescribed and fitted by a health care provider. It should be replaced every 2 years.  Sponge  A sponge is a soft, circular piece of polyurethane foam with spermicide in it. The sponge helps block sperm from entering the uterus, and the spermicide kills sperm. To use it, you make it wet and then insert it into the vagina. It should be inserted before sex, left in for at least 6 hours after sex, and removed and thrown away within 30 hours.  Spermicides  Spermicides are chemicals that kill or block sperm from entering the cervix and uterus. They can come as a cream, jelly, suppository, foam, or tablet. A spermicide should be inserted into the vagina with an applicator at least 10-15 minutes before sex to allow time for it to work. The process must be repeated every time you have sex. Spermicides do not require a prescription.  Intrauterine  contraception  Intrauterine device (IUD)  An IUD is a T-shaped device that is put in a woman's uterus. There are two types:  · Hormone IUD.This type contains progestin, a synthetic form of the hormone progesterone. This type can stay in place for 3-5 years.  · Copper IUD.This type is wrapped in copper wire. It can stay in place for 10 years.  Permanent methods of contraception  Female tubal ligation  In this method, a woman's fallopian tubes are sealed, tied, or blocked during surgery to prevent eggs from traveling to the uterus.  Hysteroscopic sterilization  In this method, a small, flexible insert is placed into each fallopian tube. The inserts cause scar tissue to form in the fallopian tubes and block them, so sperm cannot reach an egg. The procedure takes about 3 months to be effective. Another form of birth control must be used during those 3 months.  Male sterilization  This is a procedure to tie off the tubes that carry sperm (vasectomy). After the procedure, the man can still ejaculate fluid (semen). Another form of birth control must be used for 3 months after the procedure.  Natural planning methods  Natural family planning  In this method, a couple does not have sex on days when the woman could become pregnant.  Calendar method  In this method, the woman keeps track of the length of each menstrual cycle, identifies the days when pregnancy can happen, and does not have sex on those days.  Ovulation method  In this method, a couple avoids sex during ovulation.  Symptothermal method  This method involves not having sex during ovulation. The woman typically checks for ovulation by watching changes in her temperature and in the consistency of cervical mucus.  Post-ovulation method  In this method, a couple waits to have sex until after ovulation.  Where to find more information  · Centers for Disease Control and Prevention: www.cdc.gov  Summary  · Contraception, also called birth control, refers to methods or  devices that prevent pregnancy.  · Hormonal methods of contraception include implants, injections, pills, patches, vaginal rings, and emergency contraceptives.  · Barrier methods of contraception can include male condoms, female condoms, diaphragms, cervical caps, sponges, and spermicides.  · There are two types of IUDs (intrauterine devices). An IUD can be put in a woman's uterus to prevent pregnancy for 3-5 years.  · Permanent sterilization can be done through a procedure for males and females. Natural family planning methods involve nothaving sex on days when the woman could become pregnant.  This information is not intended to replace advice given to you by your health care provider. Make sure you discuss any questions you have with your health care provider.  Document Revised: 05/24/2021 Document Reviewed: 05/24/2021  Elsevier Patient Education © 2021 [employer] Inc[employer]

## 2022-03-24 RX ORDER — NORGESTIMATE AND ETHINYL ESTRADIOL 7DAYSX3 28
1 KIT ORAL DAILY
Qty: 28 TABLET | Refills: 12 | Status: SHIPPED | OUTPATIENT
Start: 2022-03-24 | End: 2023-03-24

## 2022-03-24 NOTE — TELEPHONE ENCOUNTER
Caller: Jame Sade Elis    Relationship: Self    Best call back number: [phone]     Requested Prescriptions:   Requested Prescriptions     Pending Prescriptions Disp Refills   • norgestimate-ethinyl estradiol (Ortho Tri-Cyclen, 28,) 0.18/0.215/0.25 MG-35 MCG per tablet 28 tablet 12     Sig: Take 1 tablet by mouth Daily.        Pharmacy where request should be sent: [facility] #[id number] [address] AT [facility] & [address] - [phone] [facility] [phone]      Additional details provided by patient: PATIENT IS NEEDING A REFILL. PLEASE CALL AND ADVISE     Does the patient have less than a 3 day supply:  [x] Yes  [] No    Janiya Villalta Rep   03/24/22 13:05 EDT

## 2022-04-05 ENCOUNTER — TELEPHONE (OUTPATIENT)
Dept: FAMILY MEDICINE CLINIC | Facility: CLINIC | Age: 27
End: 2022-04-05

## 2022-04-05 RX ORDER — ALBUTEROL SULFATE 90 UG/1
2 AEROSOL, METERED RESPIRATORY (INHALATION) EVERY 4 HOURS PRN
Qty: 90 G | Refills: 1 | Status: SHIPPED | OUTPATIENT
Start: 2022-04-05

## 2022-04-05 NOTE — TELEPHONE ENCOUNTER
Caller: Sade Kilgore    Relationship: Self    Best call back number: [phone]    Requested Prescriptions: INHALER (2 PUFFS AS NEEDED)       Pharmacy where request should be sent: [facility] #[id number] [address] AT [facility] & [address]  [phone] [facility] [phone]      Additional details provided by patient: DOESN'T HAVE ANYTHING TO USE. OUT OF INHALER MEDICATION.     CALL BACK ASAP. THANK YOU    Does the patient have less than a 3 day supply:  [x] Yes  [] No    Janiya Gonzalez Rep   04/05/22 11:20 EDT

## 2022-04-12 ENCOUNTER — TELEPHONE (OUTPATIENT)
Dept: FAMILY MEDICINE CLINIC | Facility: CLINIC | Age: 27
End: 2022-04-12

## 2022-04-12 RX ORDER — NORGESTIMATE AND ETHINYL ESTRADIOL 7DAYSX3 28
1 KIT ORAL DAILY
Qty: 28 TABLET | Refills: 12 | Status: CANCELLED | OUTPATIENT
Start: 2022-04-12 | End: 2023-04-12

## 2022-04-12 NOTE — TELEPHONE ENCOUNTER
Caller: Jame Sade Elis    Relationship: Self    Best call back number: [phone]    Requested Prescriptions:   Requested Prescriptions     Pending Prescriptions Disp Refills   • norgestimate-ethinyl estradiol (Ortho Tri-Cyclen, 28,) 0.18/0.215/0.25 MG-35 MCG per tablet 28 tablet 12     Sig: Take 1 tablet by mouth Daily.        Pharmacy where request should be sent: [facility] #[id number] [address] AT [facility] & [address] - [phone] [facility] [phone]         Does the patient have less than a 3 day supply:  [x] Yes  [] No    Janiya King Rep   04/12/22 11:14 EDT

## 2022-04-12 NOTE — TELEPHONE ENCOUNTER
Spoke with patient to let her know there are refills available on her prescription. I let her know she may have to call pharmacy in order for them to process the refill.   Called Ian to put in refill request, had to leave message

## 2022-05-24 ENCOUNTER — OFFICE VISIT (OUTPATIENT)
Dept: FAMILY MEDICINE CLINIC | Facility: CLINIC | Age: 27
End: 2022-05-24

## 2022-05-24 VITALS
RESPIRATION RATE: 18 BRPM | HEART RATE: 77 BPM | WEIGHT: 178 LBS | DIASTOLIC BLOOD PRESSURE: 64 MMHG | TEMPERATURE: 98.7 F | SYSTOLIC BLOOD PRESSURE: 113 MMHG | HEIGHT: 64 IN | BODY MASS INDEX: 30.39 KG/M2 | OXYGEN SATURATION: 99 %

## 2022-05-24 DIAGNOSIS — E66.9 CLASS 1 OBESITY WITHOUT SERIOUS COMORBIDITY WITH BODY MASS INDEX (BMI) OF 30.0 TO 30.9 IN ADULT, UNSPECIFIED OBESITY TYPE: ICD-10-CM

## 2022-05-24 DIAGNOSIS — A08.4 VIRAL GASTROENTERITIS: ICD-10-CM

## 2022-05-24 DIAGNOSIS — R05.9 COUGH: ICD-10-CM

## 2022-05-24 DIAGNOSIS — J30.9 ALLERGIC RHINITIS, UNSPECIFIED SEASONALITY, UNSPECIFIED TRIGGER: ICD-10-CM

## 2022-05-24 DIAGNOSIS — K90.49 MILK INTOLERANCE: Primary | ICD-10-CM

## 2022-05-24 DIAGNOSIS — R53.83 OTHER FATIGUE: ICD-10-CM

## 2022-05-24 PROCEDURE — 99214 OFFICE O/P EST MOD 30 MIN: CPT | Performed by: NURSE PRACTITIONER

## 2022-05-24 RX ORDER — ONDANSETRON HYDROCHLORIDE 8 MG/1
8 TABLET, FILM COATED ORAL EVERY 8 HOURS PRN
Qty: 20 TABLET | Refills: 0 | Status: SHIPPED | OUTPATIENT
Start: 2022-05-24

## 2022-05-24 RX ORDER — BENZONATATE 100 MG/1
100 CAPSULE ORAL 3 TIMES DAILY PRN
Qty: 30 CAPSULE | Refills: 0 | Status: SHIPPED | OUTPATIENT
Start: 2022-05-24

## 2022-05-24 RX ORDER — MONTELUKAST SODIUM 10 MG/1
10 TABLET ORAL NIGHTLY
Qty: 30 TABLET | Refills: 5 | Status: SHIPPED | OUTPATIENT
Start: 2022-05-24

## 2023-04-12 RX ORDER — NORGESTIMATE AND ETHINYL ESTRADIOL 7DAYSX3 28
KIT ORAL
Qty: 28 TABLET | Refills: 1 | Status: SHIPPED | OUTPATIENT
Start: 2023-04-12 | End: 2023-05-17

## 2023-04-12 RX ORDER — NORGESTIMATE AND ETHINYL ESTRADIOL 7DAYSX3 28
KIT ORAL
Qty: 84 TABLET | OUTPATIENT
Start: 2023-04-12

## 2023-05-17 RX ORDER — NORGESTIMATE AND ETHINYL ESTRADIOL 7DAYSX3 28
KIT ORAL
Qty: 28 TABLET | Refills: 0 | Status: SHIPPED | OUTPATIENT
Start: 2023-05-17

## 2023-06-21 PROBLEM — Z00.00 ANNUAL PHYSICAL EXAM: Status: ACTIVE | Noted: 2022-01-14

## 2024-01-24 NOTE — TELEPHONE ENCOUNTER
----- Message from DONN Adair sent at 11/9/2021 12:45 PM EST -----  All labs within normal limits.   
Psychiatric
20-Sep-2022